# Patient Record
Sex: MALE | Race: WHITE | NOT HISPANIC OR LATINO | Employment: FULL TIME | URBAN - NONMETROPOLITAN AREA
[De-identification: names, ages, dates, MRNs, and addresses within clinical notes are randomized per-mention and may not be internally consistent; named-entity substitution may affect disease eponyms.]

---

## 2024-08-23 ENCOUNTER — HOSPITAL ENCOUNTER (OUTPATIENT)
Dept: RADIOLOGY | Facility: HOSPITAL | Age: 39
Discharge: HOME | End: 2024-08-23
Payer: COMMERCIAL

## 2024-08-23 DIAGNOSIS — R07.81 RIB PAIN: ICD-10-CM

## 2024-08-23 PROCEDURE — 71046 X-RAY EXAM CHEST 2 VIEWS: CPT | Performed by: RADIOLOGY

## 2024-08-23 PROCEDURE — 71046 X-RAY EXAM CHEST 2 VIEWS: CPT

## 2025-01-24 ENCOUNTER — OFFICE VISIT (OUTPATIENT)
Dept: URGENT CARE | Age: 40
End: 2025-01-24
Payer: COMMERCIAL

## 2025-01-24 ENCOUNTER — ANCILLARY PROCEDURE (OUTPATIENT)
Dept: URGENT CARE | Age: 40
End: 2025-01-24
Payer: COMMERCIAL

## 2025-01-24 VITALS
HEART RATE: 97 BPM | BODY MASS INDEX: 24.99 KG/M2 | WEIGHT: 150 LBS | OXYGEN SATURATION: 97 % | SYSTOLIC BLOOD PRESSURE: 143 MMHG | DIASTOLIC BLOOD PRESSURE: 77 MMHG | TEMPERATURE: 98.4 F | HEIGHT: 65 IN | RESPIRATION RATE: 14 BRPM

## 2025-01-24 DIAGNOSIS — R05.9 COUGH, UNSPECIFIED TYPE: ICD-10-CM

## 2025-01-24 DIAGNOSIS — J18.9 PNEUMONIA OF LEFT LUNG DUE TO INFECTIOUS ORGANISM, UNSPECIFIED PART OF LUNG: Primary | ICD-10-CM

## 2025-01-24 LAB
POC RAPID INFLUENZA A: NEGATIVE
POC RAPID INFLUENZA B: NEGATIVE

## 2025-01-24 PROCEDURE — 87804 INFLUENZA ASSAY W/OPTIC: CPT

## 2025-01-24 PROCEDURE — 99213 OFFICE O/P EST LOW 20 MIN: CPT

## 2025-01-24 PROCEDURE — 3008F BODY MASS INDEX DOCD: CPT

## 2025-01-24 PROCEDURE — 71046 X-RAY EXAM CHEST 2 VIEWS: CPT

## 2025-01-24 RX ORDER — AZITHROMYCIN 250 MG/1
TABLET, FILM COATED ORAL
Qty: 6 TABLET | Refills: 0 | Status: SHIPPED | OUTPATIENT
Start: 2025-01-24 | End: 2025-01-29

## 2025-01-24 RX ORDER — AMOXICILLIN AND CLAVULANATE POTASSIUM 875; 125 MG/1; MG/1
875 TABLET, FILM COATED ORAL 2 TIMES DAILY
Qty: 20 TABLET | Refills: 0 | Status: SHIPPED | OUTPATIENT
Start: 2025-01-24

## 2025-01-24 ASSESSMENT — ENCOUNTER SYMPTOMS
FATIGUE: 0
VOMITING: 0
FEVER: 0
SORE THROAT: 1
DIARRHEA: 0
WHEEZING: 0
ABDOMINAL PAIN: 0
CHEST TIGHTNESS: 0
SINUS PRESSURE: 1
STRIDOR: 0
CHILLS: 0
SHORTNESS OF BREATH: 0
COUGH: 1
DIZZINESS: 0

## 2025-01-24 NOTE — PATIENT INSTRUCTIONS
Discharge instructions:    Please follow up with your Primary Care Physician within the next 7-10 days.    Please follow-up with your primary care physician as above.  If no improvement in symptoms over the next 48 to 72 hours you must go to the emergency room.  If you develop any chest pain, shortness of breath, difficulty breathing or worsening symptoms or high uncontrollable fevers please go to the emergency room for reevaluation.    It is important to take prescriptions as prescribed and complete all antibiotics.     If your symptoms worsen you are instructed to immediately go to the emergency room for reevaluation and further assessment.    If you develop any chest pain, SOB, or difficulty breathing you are instructed to go to the emergency room for reevaluation.    All discharge instructions will be provided and explained to the patient at discharge.    If you have any questions regarding your treatment plan please call the [facility] urgent care clinic.

## 2025-01-24 NOTE — PROGRESS NOTES
Subjective   Patient ID: Jenaro Fox is a 39 y.o. male. They present today with a chief complaint of Cough and Nasal Congestion (X5 days  Flu symptoms ).    History of Present Illness  Patient is a 39-year-old male presenting to the clinic with complaints of cough and nasal congestion postnasal drip.  Symptoms have been present for 5 days now.  Patient states he initially developed a scratchy throat around 5 days ago.  Patient states that improved.  However after the sore throat he developed a cough that was productive of phlegm.  Patient states he also developed nasal congestion.  Patient states he has had intermittent sinus pressure over the ethmoid sinus.  Patient does have some sinus tenderness today.  Patient also has a cough that is productive of phlegm at this time.  Patient denies any ear pain or ear drainage.  No chest pain or shortness of breath or dyspnea.  Patient does smoke a pack of cigarettes a day.      History provided by:  Patient  Cough  Associated symptoms include postnasal drip and a sore throat. Pertinent negatives include no chest pain, chills, ear pain, fever, shortness of breath or wheezing.       Past Medical History  Allergies as of 01/24/2025    (No Known Allergies)       (Not in a hospital admission)       Past Medical History:   Diagnosis Date    Personal history of other diseases of the circulatory system     History of hypertension       Past Surgical History:   Procedure Laterality Date    OTHER SURGICAL HISTORY  07/10/2015    Incision And Drainage Of Pilonidal Cyst        reports that he has been smoking cigarettes. He has never used smokeless tobacco. He reports that he does not use drugs.    Review of Systems  Review of Systems   Constitutional:  Negative for chills, fatigue and fever.   HENT:  Positive for congestion, postnasal drip, sinus pressure and sore throat. Negative for ear discharge and ear pain.    Respiratory:  Positive for cough. Negative for chest tightness,  "shortness of breath, wheezing and stridor.    Cardiovascular:  Negative for chest pain.   Gastrointestinal:  Negative for abdominal pain, diarrhea and vomiting.   Neurological:  Negative for dizziness.                                  Objective    Vitals:    01/24/25 0821   BP: 143/77   BP Location: Left arm   Patient Position: Sitting   BP Cuff Size: Adult   Pulse: 97   Resp: 14   Temp: 36.9 °C (98.4 °F)   TempSrc: Oral   SpO2: 97%   Weight: 68 kg (150 lb)   Height: 1.651 m (5' 5\")     No LMP for male patient.    Physical Exam  Vitals reviewed.   Constitutional:       General: He is awake. He is not in acute distress.     Appearance: Normal appearance. He is well-developed. He is not ill-appearing or toxic-appearing.   HENT:      Head: Normocephalic and atraumatic.      Right Ear: Tympanic membrane, ear canal and external ear normal.      Left Ear: Tympanic membrane, ear canal and external ear normal.      Nose: Congestion present.      Right Sinus: Maxillary sinus tenderness present.      Left Sinus: Maxillary sinus tenderness present.      Mouth/Throat:      Mouth: Mucous membranes are moist.      Pharynx: Oropharynx is clear. Uvula midline. No oropharyngeal exudate or posterior oropharyngeal erythema.      Tonsils: No tonsillar exudate or tonsillar abscesses.   Cardiovascular:      Rate and Rhythm: Normal rate and regular rhythm.      Heart sounds: Normal heart sounds, S1 normal and S2 normal. No murmur heard.     No friction rub. No gallop.   Pulmonary:      Effort: Pulmonary effort is normal. No respiratory distress.      Breath sounds: No stridor. Rhonchi present. No wheezing or rales.   Skin:     General: Skin is warm.   Neurological:      General: No focal deficit present.      Mental Status: He is alert and oriented to person, place, and time. Mental status is at baseline.      Gait: Gait is intact.   Psychiatric:         Mood and Affect: Mood normal.         Behavior: Behavior normal. Behavior is " cooperative.         Procedures    Point of Care Test & Imaging Results from this visit  No results found for this visit on 01/24/25.   No results found.    Diagnostic study results (if any) were reviewed by [facility].    Assessment/Plan   Allergies, medications, history, and pertinent labs/EKGs/Imaging reviewed by Dave Connor PA-C.     Medical Decision Making  Patient is a 39-year-old male presenting to the clinic with complaints of cough and nasal congestion postnasal drip.  Symptoms have been present for 5 days now.  Patient states he initially developed a scratchy throat around 5 days ago.  Patient states that improved.  However after the sore throat he developed a cough that was productive of phlegm.  Patient states he also developed nasal congestion.  Patient states he has had intermittent sinus pressure over the ethmoid sinus.  Patient does have some sinus tenderness today.  Patient also has a cough that is productive of phlegm at this time.  Patient denies any ear pain or ear drainage.  No chest pain or shortness of breath or dyspnea.  Patient does smoke a pack of cigarettes a day.  Vital signs in the clinic are stable. Patient is not tachycardic.  No fever.  Pulse ox 97%.  No signs of otitis media.  Pharyngeal evaluation normal.  Patient does have tenderness to the ethmoid sinus during palpation.  Patient also has rhonchi of the bilateral lungs.  Unknown if this is secondary to chronic smoking or acute.  Will have chest x-ray assessment for rule out pneumonia.  Chest x-ray impression: New left basilar infiltrate.  Consider pneumonia.  There is thickening of the right minor fissure which may represent fluid.  Recommend clinical correlation and short-term follow-up.  Attending physician consulted on patient case.  Attending physician okay with follow-up to primary care physician to obtain CT scan within the next several weeks.  Patient's pneumonia will be treated with dual therapy.  Discharge  instructions as follows. Discharge instructions: Please follow up with your Primary Care Physician within the next 7-10 days. Please follow-up with your primary care physician as above.  If no improvement in symptoms over the next 48 to 72 hours you must go to the emergency room.  If you develop any chest pain, shortness of breath, difficulty breathing or worsening symptoms or high uncontrollable fevers please go to the emergency room for reevaluation. It is important to take prescriptions as prescribed and complete all antibiotics. If your symptoms worsen you are instructed to immediately go to the emergency room for reevaluation and further assessment. If you develop any chest pain, SOB, or difficulty breathing you are instructed to go to the emergency room for reevaluation. All discharge instructions will be provided and explained to the patient at discharge. If you have any questions regarding your treatment plan please call the [facility] urgent care clinic.     Orders and Diagnoses  There are no diagnoses linked to this encounter.    Medical Admin Record      Patient disposition: Home    Electronically signed by [facility]  8:34 AM